# Patient Record
Sex: FEMALE | Race: WHITE
[De-identification: names, ages, dates, MRNs, and addresses within clinical notes are randomized per-mention and may not be internally consistent; named-entity substitution may affect disease eponyms.]

---

## 2020-04-25 ENCOUNTER — HOSPITAL ENCOUNTER (EMERGENCY)
Dept: HOSPITAL 62 - ER | Age: 80
Discharge: HOME | End: 2020-04-25
Payer: MEDICARE

## 2020-04-25 VITALS — DIASTOLIC BLOOD PRESSURE: 93 MMHG | SYSTOLIC BLOOD PRESSURE: 165 MMHG

## 2020-04-25 DIAGNOSIS — I44.4: ICD-10-CM

## 2020-04-25 DIAGNOSIS — I10: Primary | ICD-10-CM

## 2020-04-25 DIAGNOSIS — Z88.6: ICD-10-CM

## 2020-04-25 DIAGNOSIS — Z88.5: ICD-10-CM

## 2020-04-25 DIAGNOSIS — L60.0: ICD-10-CM

## 2020-04-25 DIAGNOSIS — Z95.5: ICD-10-CM

## 2020-04-25 LAB
ADD MANUAL DIFF: NO
ALBUMIN SERPL-MCNC: 4.3 G/DL (ref 3.5–5)
ALP SERPL-CCNC: 101 U/L (ref 38–126)
ANION GAP SERPL CALC-SCNC: 8 MMOL/L (ref 5–19)
AST SERPL-CCNC: 45 U/L (ref 14–36)
BASOPHILS # BLD AUTO: 0 10^3/UL (ref 0–0.2)
BASOPHILS NFR BLD AUTO: 0.4 % (ref 0–2)
BILIRUB DIRECT SERPL-MCNC: 0 MG/DL (ref 0–0.4)
BILIRUB SERPL-MCNC: 0.5 MG/DL (ref 0.2–1.3)
BUN SERPL-MCNC: 15 MG/DL (ref 7–20)
CALCIUM: 9.4 MG/DL (ref 8.4–10.2)
CHLORIDE SERPL-SCNC: 104 MMOL/L (ref 98–107)
CO2 SERPL-SCNC: 28 MMOL/L (ref 22–30)
EOSINOPHIL # BLD AUTO: 0.1 10^3/UL (ref 0–0.6)
EOSINOPHIL NFR BLD AUTO: 1.7 % (ref 0–6)
ERYTHROCYTE [DISTWIDTH] IN BLOOD BY AUTOMATED COUNT: 13.4 % (ref 11.5–14)
GLUCOSE SERPL-MCNC: 99 MG/DL (ref 75–110)
HCT VFR BLD CALC: 41.2 % (ref 36–47)
HGB BLD-MCNC: 14.2 G/DL (ref 12–15.5)
LYMPHOCYTES # BLD AUTO: 1.8 10^3/UL (ref 0.5–4.7)
LYMPHOCYTES NFR BLD AUTO: 30.4 % (ref 13–45)
MCH RBC QN AUTO: 31.8 PG (ref 27–33.4)
MCHC RBC AUTO-ENTMCNC: 34.5 G/DL (ref 32–36)
MCV RBC AUTO: 92 FL (ref 80–97)
MONOCYTES # BLD AUTO: 0.7 10^3/UL (ref 0.1–1.4)
MONOCYTES NFR BLD AUTO: 11.5 % (ref 3–13)
NEUTROPHILS # BLD AUTO: 3.4 10^3/UL (ref 1.7–8.2)
NEUTS SEG NFR BLD AUTO: 56 % (ref 42–78)
NT PRO BNP: 109 PG/ML (ref ?–450)
PLATELET # BLD: 232 10^3/UL (ref 150–450)
POTASSIUM SERPL-SCNC: 3.9 MMOL/L (ref 3.6–5)
PROT SERPL-MCNC: 7.5 G/DL (ref 6.3–8.2)
RBC # BLD AUTO: 4.48 10^6/UL (ref 3.72–5.28)
TOTAL CELLS COUNTED % (AUTO): 100 %
TROPONIN I SERPL-MCNC: < 0.012 NG/ML
WBC # BLD AUTO: 6 10^3/UL (ref 4–10.5)

## 2020-04-25 PROCEDURE — 84484 ASSAY OF TROPONIN QUANT: CPT

## 2020-04-25 PROCEDURE — 93005 ELECTROCARDIOGRAM TRACING: CPT

## 2020-04-25 PROCEDURE — 85025 COMPLETE CBC W/AUTO DIFF WBC: CPT

## 2020-04-25 PROCEDURE — 99284 EMERGENCY DEPT VISIT MOD MDM: CPT

## 2020-04-25 PROCEDURE — 83880 ASSAY OF NATRIURETIC PEPTIDE: CPT

## 2020-04-25 PROCEDURE — 71045 X-RAY EXAM CHEST 1 VIEW: CPT

## 2020-04-25 PROCEDURE — 73660 X-RAY EXAM OF TOE(S): CPT

## 2020-04-25 PROCEDURE — 93010 ELECTROCARDIOGRAM REPORT: CPT

## 2020-04-25 PROCEDURE — 80053 COMPREHEN METABOLIC PANEL: CPT

## 2020-04-25 PROCEDURE — 36415 COLL VENOUS BLD VENIPUNCTURE: CPT

## 2020-04-25 PROCEDURE — 96374 THER/PROPH/DIAG INJ IV PUSH: CPT

## 2020-04-25 NOTE — ER DOCUMENT REPORT
Entered by GERMAN RIZVI SCRIBE  04/25/20 1640 





Acting as scribe for:TREV BEASLEY MD





ED General





- General


Chief Complaint: High Blood Pressure


Stated Complaint: BLOOD PRESSURE PROBLEMS


Time Seen by Provider: 04/25/20 15:42


Information source: Patient


Notes: 





This 80-year-old female presents to the emergency department as requested by the

urgent care for high blood pressure. Patient presented to the urgent care today 

complaining of left great toe pain that began three nights ago. Patient explains

that she had no injury to the toe and she started soaking her toe in Epsom salt 

three times a day. Patient said that the pain and redness worsened last night. 

Patient reports a history of an ingrown toe nail on the same toe that she has 

seen a podiatrist for previously. Patient denies chest pain. 


Patient states that she has had a recent change to her hypertension medication 

but does not check her blood pressure regularly so is unsure whether the change 

in medication has been effective.


Patient mentions that she is visiting her daughter and is not from the area. 

Patient states that she is established with a podiatrist in her home town that 

she will see later next month.








- Related Data


Allergies/Adverse Reactions: 


                                        





codeine Allergy (Verified 04/25/20 15:45)


   


fentanyl Allergy (Verified 04/25/20 15:45)


   











Past Medical History





- General


Information source: Patient





- Social History


Smoking Status: Never Smoker


Cigarette use (# per day): No


Chew tobacco use (# tins/day): No


Family History: Reviewed & Not Pertinent


Patient has suicidal ideation: No


Patient has homicidal ideation: No





- Past Medical History


Cardiac Medical History: Reports: Hx Heart Attack, Hx Hypertension


Endocrine Medical History: Reports: Hx Diabetes Mellitus Type 2


Musculoskeletal Medical History: Reports Hx Arthritis


Psychiatric Medical History: Reports: Hx Anxiety


Past Surgical History: Reports: Hx Coronary Stent - X5 last one in 2017, Hx Nose

Surgery - X3





Review of Systems





- Review of Systems


Constitutional: No symptoms reported


EENT: No symptoms reported


Cardiovascular: See HPI.  denies: Chest pain


Respiratory: No symptoms reported


Gastrointestinal: No symptoms reported


Genitourinary: No symptoms reported


Female Genitourinary: No symptoms reported


Musculoskeletal: See HPI, Other - Toe pain and swelling


Skin: No symptoms reported


Hematologic/Lymphatic: No symptoms reported


Neurological/Psychological: No symptoms reported


-: Yes All other systems reviewed and negative





Physical Exam





- Vital signs


Vitals: 


                                        











Temp


 


 98.1 F 


 


 04/25/20 15:28














- Notes


Notes: 





Physical Exam:


 


General: Alert, appears well. 


 


HEENT: Normocephalic. Atraumatic. PERRL. Extraocular movements intact. 

Oropharynx clear.


 


Neck: Supple. Non-tender.


 


Respiratory: No respiratory distress. Clear and equal breath sounds bilaterally.


 


Cardiovascular: Regular rate and rhythm. 


 


Abdominal: Normal Inspection. Non-tender. No distension. Normal Bowel Sounds. 


 


Back: No gross abnormalities. 


 


Extremities: Moves all four extremities.


Upper extremities: Normal inspection. Normal ROM.  


Lower extremities: No edema. Normal ROM. Left great toe has erythema and 

swelling that has an ingrown toenail. Toe is tender to palpation. 


 


Neurological: Normal cognition. AAOx4. Normal speech.  


 


Psychological: Normal affect. Normal Mood. 


 


Skin: Warm. Dry. Normal color.





Course





- Re-evaluation


Re-evalutation: 





04/25/20 17:54


Patient is having no acute signs of chest pain shortness of breath altered 

mental status.  Patient has a longstanding history of high blood pressure and 

has 6 stents in her heart.  Denies any chest pain.


04/25/20 18:51


Patient reports she feels fine having no symptoms of any shortness of breath or 

chest pain or headache.





- Vital Signs


Vital signs: 


                                        











Temp Pulse Resp BP Pulse Ox


 


 98.0 F   93   20   179/94 H  94 


 


 04/25/20 18:44  04/25/20 15:37  04/25/20 18:44  04/25/20 18:44  04/25/20 18:44














- Laboratory


Result Diagrams: 


                                 04/25/20 17:31





                                 04/25/20 17:31


Laboratory results interpreted by me: 


                                        











  04/25/20





  17:31


 


AST  45 H


 


ALT  41 H














- Diagnostic Test


Radiology reviewed: Image reviewed, Reports reviewed


Radiology results interpreted by me: 





04/25/20 17:53


Radiographs left great toe shows no acute process no acute radiographic 

orthopedic bony abnormality.


Chest x-ray 1 view shows


Left ventricular hypertrophy borderline cardiomegaly hilar fullness with no 

signs of CHF.  No infiltrate.





- EKG Interpretation by Me


Additional EKG results interpreted by me: 





04/25/20 17:52


Twelve-lead EKG shows a normal sinus rhythm rate of 83 left anterior fascicular 

block anteroseptal infarct age indeterminate no acute ST changes at this time.





Discharge





- Discharge


Clinical Impression: 


 Ingrown toenail of left foot with infection, Hypertension, uncontrolled





Condition: Stable


Disposition: HOME, SELF-CARE


Instructions:  High Blood Pressure, Requiring Treatment (OM)


Additional Instructions: 


Ingrown Nail





     You have an ingrown nail.  An ingrown nail develops when the tissues near 

the nail are pushed up over the nail.  Irritation develops and infection follows

.  An ingrown nail can result from poorly fitting shoes, improper cutting of the

nail, or minor injuries.  Once the tissues at the edge of the nail swell, the 

problem can become chronic.


     Emergency treatment is usually removal of the portion of the nail that has 

become ingrown.  This is followed by hot soaks three to four times a day.  

Antibiotics may be necessary if infection is present.


     After the toe heals, make certain there is no pressure on the area, either 

from shoes or another toe.  Trim the toenails straight across, not curved back 

into the corners.  If ingrown nails recur, an operation to remove excess tissue 

near the nail, or narrowing of the nail, may be necessary.


     Call the doctor or return if swelling increases, or red streaks, swelling, 

or swollen glands are found.


Begin antibiotics that were prescribed to you from the urgent care clinic today.

 Continue their instructions regarding taking antibiotics Tylenol as needed for 

pain.  Follow-up with podiatrist for further evaluation and definitive care of 

your ingrown toenail.  You may take Tylenol as needed for pain.





You have hypertension out of control today.  From what I understand you recently

had been increased on taken antihypertensive medications however there has not 

been any monitoring of what those medications have done to improve your blood 

pressure.  Today's elevated blood pressure may be circumstantial as it was 

related to going to the emergency urgent care and having manipulation of the toe

and developed pain and blood pressure elevation.  The elevation of your blood 

pressure today has not caused you to have a stroke or heart attack there has 

been improvement in your blood pressure have been given 1 dose of 

antihypertensive medications here in the ED.  Continue your usual medications 

that you take for hypertension as well as your medications you take otherwise 

including diabetes medication.  Please monitor your blood pressure and your 

diabetes on a daily basis.





I personally performed the services described in the documentation, reviewed and

edited the documentation which was dictated to the scribe in my presence, and it

accurately records my words and actions.

## 2020-04-25 NOTE — RADIOLOGY REPORT (SQ)
EXAM DESCRIPTION:  CHEST SINGLE VIEW



IMAGES COMPLETED DATE/TIME:  4/25/2020 5:53 pm



REASON FOR STUDY:  htn



COMPARISON:  None.



EXAM PARAMETERS:  NUMBER OF VIEWS: One view.

TECHNIQUE: Single frontal radiographic view of the chest acquired.

RADIATION DOSE: NA

LIMITATIONS: None.



FINDINGS:  LUNGS AND PLEURA: No opacities, masses or pneumothorax. No pleural effusion.

MEDIASTINUM AND HILAR STRUCTURES: No masses.  Contour normal.

HEART AND VASCULAR STRUCTURES: Heart normal in size.  Normal vasculature.

BONES: No acute findings.  Degenerative changes in the spine.

HARDWARE: None in the chest.

OTHER: No other significant finding.



IMPRESSION:  NO ACUTE RADIOGRAPHIC FINDING IN THE CHEST.



TECHNICAL DOCUMENTATION:  JOB ID:  [id number]

 2011 [employer]- All Rights Reserved



Reading location - IP/workstation name: [email or web address]

## 2020-04-25 NOTE — ER DOCUMENT REPORT
ED Medical Screen (RME)





- General


Chief Complaint: High Blood Pressure


Stated Complaint: BLOOD PRESSURE PROBLEMS


Time Seen by Provider: 04/25/20 15:42


Information source: Patient


Notes: 





Patient states that she kicked an object at home yesterday injuring the left 

great toe and it bled a little.  Patient states she went to the urgent care and 

they told her her toe was infected and they went and put her on antibiotics.  

They became concerned that patient's blood pressure was up and wanted her to 

come here for further evaluation.  Patient denies any chest pain shortness of 

breath lightheadedness or dizziness.  Patient denies any headache.  Patient 

states that occasionally she becomes short of breath when she becomes anxious 

but denies of symptoms at this time.  Patient states she was told that she 

needed to be evaluated in case her blood pressure was causing her stents to 

become blocked or for her to have a stroke.





I have greeted and performed a rapid initial assessment of this patient.  A 

comprehensive ED assessment and evaluation of the patient, analysis of test 

results and completion of the medical decision making process will be conducted 

by additional ED providers.





- Related Data


Allergies/Adverse Reactions: 


                                        





codeine Allergy (Verified 04/25/20 15:45)


   


fentanyl Allergy (Verified 04/25/20 15:45)


   











Physical Exam





- Vital signs


Vitals: 





                                        











Temp


 


 98.1 F 


 


 04/25/20 15:28














- General


General appearance: Appears well, Alert


Notes: 





Left great toe tenderness, swelling blood noted along nail margin





Course





- Vital Signs


Vital signs: 





                                        











Temp Pulse Resp BP Pulse Ox


 


 98.1 F   93   18   159/104 H  95 


 


 04/25/20 15:37  04/25/20 15:37  04/25/20 15:37  04/25/20 15:37  04/25/20 15:37

## 2020-04-25 NOTE — RADIOLOGY REPORT (SQ)
EXAM DESCRIPTION:  TOE LEFT



IMAGES COMPLETED DATE/TIME:  4/25/2020 4:08 pm



REASON FOR STUDY:  toe injury, L gr toe



COMPARISON:  None.



NUMBER OF VIEWS:  Three views.



TECHNIQUE:  AP, lateral, and oblique images acquired of the left first toe.



LIMITATIONS:  None.



FINDINGS:  MINERALIZATION: Normal.

BONES: No acute fracture or dislocation. No worrisome bone lesions. No significant osteophytes.

JOINTS: No erosions.  No jim-articular osteopenia.  No chondrocalcinosis.

SOFT TISSUES: No swelling.  No calcifications.

OTHER: No other significant finding.



IMPRESSION:  NO SIGNIFICANT RADIOGRAPHIC ABNORMALITY.



COMMENT:  SITE OF TRAUMA/COMPLAINT MARKED/STAMP COMPLETED: YES.



TECHNICAL DOCUMENTATION:  JOB ID:  [id number]

 2011 [employer]- All Rights Reserved



Reading location - IP/workstation name: [email or web address]

## 2020-04-26 NOTE — EKG REPORT
SEVERITY:- ABNORMAL ECG -

SINUS RHYTHM

LEFT ANTERIOR FASCICULAR BLOCK

PROBABLE ANTEROSEPTAL INFARCT, AGE INDETERM

:

Confirmed by: Berhane Cannon 26-Apr-2020 10:33:29